# Patient Record
Sex: MALE | Race: WHITE
[De-identification: names, ages, dates, MRNs, and addresses within clinical notes are randomized per-mention and may not be internally consistent; named-entity substitution may affect disease eponyms.]

---

## 2021-08-26 ENCOUNTER — HOSPITAL ENCOUNTER (EMERGENCY)
Dept: HOSPITAL 47 - EC | Age: 56
Discharge: HOME | End: 2021-08-26
Payer: COMMERCIAL

## 2021-08-26 VITALS
TEMPERATURE: 98.5 F | SYSTOLIC BLOOD PRESSURE: 108 MMHG | RESPIRATION RATE: 22 BRPM | HEART RATE: 85 BPM | DIASTOLIC BLOOD PRESSURE: 67 MMHG

## 2021-08-26 DIAGNOSIS — G44.209: ICD-10-CM

## 2021-08-26 DIAGNOSIS — G44.009: Primary | ICD-10-CM

## 2021-08-26 DIAGNOSIS — Z86.718: ICD-10-CM

## 2021-08-26 DIAGNOSIS — F17.200: ICD-10-CM

## 2021-08-26 PROCEDURE — 99284 EMERGENCY DEPT VISIT MOD MDM: CPT

## 2021-08-26 PROCEDURE — 96372 THER/PROPH/DIAG INJ SC/IM: CPT

## 2021-08-26 PROCEDURE — 70450 CT HEAD/BRAIN W/O DYE: CPT

## 2021-08-26 NOTE — CT
EXAMINATION TYPE: CT brain wo con

 

DATE OF EXAM: 8/26/2021

 

COMPARISON: None

 

HISTORY: Headache x 3 weeks

 

CT DLP: 1170.4 mGycm

Automated exposure control for dose reduction was used.

 

Ventricles have normal size. There is no mass effect nor midline shift. There is no sign of intracran
ial hemorrhage. Calvarium is intact.

 

IMPRESSION:

Negative unenhanced head CT scan.

## 2021-08-26 NOTE — ED
Headache HPI





- General


Chief Complaint: Headache


Stated Complaint: headache


Time Seen by Provider: 08/26/21 05:30


Mode of arrival: ambulatory


Limitations: no limitations





- Related Data


                                    Allergies











Allergy/AdvReac Type Severity Reaction Status Date / Time


 


codeine Allergy  Nausea & Verified 08/26/21 05:35





   Vomiting  














Review of Systems


ROS Statement: 


Those systems with pertinent positive or pertinent negative responses have been 

documented in the HPI.





ROS Other: All systems not noted in ROS Statement are negative.





Past Medical History


Additional Past Medical History / Comment(s): headaches, DVT


History of Any Multi-Drug Resistant Organisms: None Reported


Additional Past Surgical History / Comment(s): vein removal right leg


Past Psychological History: No Psychological Hx Reported


Smoking Status: Current every day smoker


Past Alcohol Use History: None Reported


Past Drug Use History: None Reported





General Exam


Limitations: no limitations





Course


                                   Vital Signs











  08/26/21





  05:29


 


Temperature 98.5 F


 


Pulse Rate 85


 


Respiratory 22





Rate 


 


Blood Pressure 108/67


 


O2 Sat by Pulse 96





Oximetry 














Disposition


Clinical Impression: 


 Headache, Cluster headaches, Tension headache





Disposition: HOME SELF-CARE


Condition: Good


Instructions (If sedation given, give patient instructions):  Acute Headache 

(ED)


Is patient prescribed a controlled substance at d/c from ED?: No


Referrals: 


Karlos Cabrales [Primary Care Provider] - 1-2 days

## 2021-09-03 ENCOUNTER — HOSPITAL ENCOUNTER (OUTPATIENT)
Dept: HOSPITAL 47 - LABWHC1 | Age: 56
Discharge: HOME | End: 2021-09-03
Attending: FAMILY MEDICINE
Payer: COMMERCIAL

## 2021-09-03 DIAGNOSIS — Z12.11: ICD-10-CM

## 2021-09-03 DIAGNOSIS — Z72.0: ICD-10-CM

## 2021-09-03 DIAGNOSIS — F41.9: ICD-10-CM

## 2021-09-03 DIAGNOSIS — Z00.00: Primary | ICD-10-CM

## 2021-09-03 LAB
BASOPHILS # BLD AUTO: 0.03 X 10*3/UL (ref 0–0.1)
BASOPHILS NFR BLD AUTO: 0.3 %
EOSINOPHIL # BLD AUTO: 0.22 X 10*3/UL (ref 0.04–0.35)
EOSINOPHIL NFR BLD AUTO: 2 %
ERYTHROCYTE [DISTWIDTH] IN BLOOD BY AUTOMATED COUNT: 4.36 X 10*6/UL (ref 4.4–5.6)
ERYTHROCYTE [DISTWIDTH] IN BLOOD: 12.8 % (ref 11.5–14.5)
HCT VFR BLD AUTO: 42.2 % (ref 39.6–50)
HGB BLD-MCNC: 14.4 G/DL (ref 13–17)
LYMPHOCYTES # SPEC AUTO: 1.38 X 10*3/UL (ref 0.9–5)
LYMPHOCYTES NFR SPEC AUTO: 12.8 %
MCH RBC QN AUTO: 33 PG (ref 27–32)
MCHC RBC AUTO-ENTMCNC: 34.1 G/DL (ref 32–37)
MCV RBC AUTO: 96.8 FL (ref 80–97)
MONOCYTES # BLD AUTO: 0.67 X 10*3/UL (ref 0.2–1)
MONOCYTES NFR BLD AUTO: 6.2 %
NEUTROPHILS # BLD AUTO: 8.47 X 10*3/UL (ref 1.8–7.7)
NEUTROPHILS NFR BLD AUTO: 78.2 %
PLATELET # BLD AUTO: 257 X 10*3/UL (ref 140–440)
WBC # BLD AUTO: 10.82 X 10*3/UL (ref 4.5–10)

## 2021-09-03 PROCEDURE — 84443 ASSAY THYROID STIM HORMONE: CPT

## 2021-09-03 PROCEDURE — 85025 COMPLETE CBC W/AUTO DIFF WBC: CPT

## 2021-09-03 PROCEDURE — 80053 COMPREHEN METABOLIC PANEL: CPT

## 2021-09-03 PROCEDURE — 36415 COLL VENOUS BLD VENIPUNCTURE: CPT

## 2021-09-03 PROCEDURE — 80061 LIPID PANEL: CPT

## 2021-09-04 LAB
ALBUMIN SERPL-MCNC: 4.6 G/DL (ref 3.8–4.9)
ALBUMIN/GLOB SERPL: 2.56 G/DL (ref 1.6–3.17)
ALP SERPL-CCNC: 46 U/L (ref 41–126)
ALT SERPL-CCNC: 16 U/L (ref 10–49)
ANION GAP SERPL CALC-SCNC: 11.2 MMOL/L (ref 4–12)
AST SERPL-CCNC: 19 U/L (ref 14–35)
BUN SERPL-SCNC: 11 MG/DL (ref 9–27)
BUN/CREAT SERPL: 13.75 RATIO (ref 12–20)
CALCIUM SPEC-MCNC: 10.3 MG/DL (ref 8.7–10.3)
CHLORIDE SERPL-SCNC: 109 MMOL/L (ref 96–109)
CHOLEST SERPL-MCNC: 169 MG/DL (ref 0–200)
CO2 SERPL-SCNC: 22.8 MMOL/L (ref 21.6–31.8)
GLOBULIN SER CALC-MCNC: 1.8 G/DL (ref 1.6–3.3)
GLUCOSE SERPL-MCNC: 95 MG/DL (ref 70–110)
HDLC SERPL-MCNC: 46 MG/DL (ref 40–60)
LDLC SERPL CALC-MCNC: 104 MG/DL (ref 0–131)
POTASSIUM SERPL-SCNC: 4.3 MMOL/L (ref 3.5–5.5)
PROT SERPL-MCNC: 6.4 G/DL (ref 6.2–8.2)
SODIUM SERPL-SCNC: 143 MMOL/L (ref 135–145)
TRIGL SERPL-MCNC: 95 MG/DL (ref 0–149)
VLDLC SERPL CALC-MCNC: 19 MG/DL (ref 5–40)

## 2021-11-17 ENCOUNTER — HOSPITAL ENCOUNTER (OUTPATIENT)
Dept: HOSPITAL 47 - ORWHC2ENDO | Age: 56
Discharge: HOME | End: 2021-11-17
Attending: SURGERY
Payer: COMMERCIAL

## 2021-11-17 VITALS — DIASTOLIC BLOOD PRESSURE: 69 MMHG | HEART RATE: 56 BPM | SYSTOLIC BLOOD PRESSURE: 105 MMHG

## 2021-11-17 VITALS — TEMPERATURE: 98 F

## 2021-11-17 VITALS — BODY MASS INDEX: 21.8 KG/M2

## 2021-11-17 VITALS — RESPIRATION RATE: 16 BRPM

## 2021-11-17 DIAGNOSIS — K57.30: ICD-10-CM

## 2021-11-17 DIAGNOSIS — Z79.899: ICD-10-CM

## 2021-11-17 DIAGNOSIS — Z12.11: Primary | ICD-10-CM

## 2021-11-17 DIAGNOSIS — Z88.5: ICD-10-CM

## 2021-11-17 DIAGNOSIS — Z80.9: ICD-10-CM

## 2021-11-17 DIAGNOSIS — Z98.890: ICD-10-CM

## 2021-11-17 DIAGNOSIS — F17.210: ICD-10-CM

## 2021-11-17 DIAGNOSIS — K63.5: ICD-10-CM

## 2021-11-17 DIAGNOSIS — Z86.718: ICD-10-CM

## 2021-11-17 DIAGNOSIS — K21.9: ICD-10-CM

## 2021-11-17 DIAGNOSIS — D12.3: ICD-10-CM

## 2021-11-17 DIAGNOSIS — K64.0: ICD-10-CM

## 2021-11-17 PROCEDURE — 88305 TISSUE EXAM BY PATHOLOGIST: CPT

## 2021-11-17 PROCEDURE — 45380 COLONOSCOPY AND BIOPSY: CPT

## 2021-11-17 PROCEDURE — 45385 COLONOSCOPY W/LESION REMOVAL: CPT

## 2021-11-17 NOTE — P.PCN
Date of Procedure: 11/17/21


Description of Procedure: 























PREOPERATIVE DIAGNOSIS:


Colonoscopy screening





POSTOPERATIVE DIAGNOSIS:


Tubular adenoma hepatic flexure


Tubular adenoma transverse colon


Sigmoid diverticulosis





OPERATION:


Colonoscopy to the ileocecal valve and appendiceal orifice, cecum


Colonoscopy with hot snare polypectomy


Colonoscopy with cold forceps biopsy





SURGEON: Erika Parnell MD.





ANESTHESIA: MAC.





INDICATIONS:


The patient is an 56-year-old male who presents for his first colonoscopy.  

Benefits and risks were described and informed consent was obtained.





DESCRIPTION OF PROCEDURE:


The patient had undergone Sutab prep.  The patient had been brought into the 

operating room and laid in the left lateral decubitus position.  After adequate 

intravenous sedation, the rectum was examined with 2% lidocaine jelly. The 

prostate was unremarkable.  No external hemorrhoids were encountered. The rectal

tone was within normal limits. No lesions were palpated in the rectal vault. An 

Olympus colonoscope was advanced until the cecum, ileocecal valve and 

appendiceal orifice were clearly viewed.  The prep was excellent.  Moderate 

sigmoid diverticulosis was encountered.  Colonic polyps were found and removed. 

No evidence of focal colitis was found. Retroflexion of the scope demonstrated 

grade 1 internal hemorrhoids without active bleeding or inflammation. The colon 

was desufflated. The patient had tolerated the procedure well. 





Withdrawal time was over 6 minutes.





FINDINGS:


Aronchick preparation quality scale 1 (1-5)


Internal hemorrhoids, grade 1 


No external hemorrhoids


No arteriovenous malformations.


Sigmoid diverticulosis, moderate


Removal of 2 polyps:


- Snare polypectomy splenic flexure, 8 mm tubulovillous adenoma polyp.


- Snare polypectomy mid transverse colon, 5 mm flat villous adenoma polyp.


No focal colitis.





RECOMMENDATIONS:


Repeat colonoscopy 3 years, 2024





Plan - Discharge Summary


Discharge Rx Participant: No


New Discharge Prescriptions: 


Continue


   Famotidine [Pepcid] 20 mg PO DAILY


Discharge Medication List





Famotidine [Pepcid] 20 mg PO DAILY 11/16/21 [History]








Follow up Appointment(s)/Referral(s): 


Erika Parnell MD [STAFF PHYSICIAN] - 11/30/21


Patient Instructions/Handouts:  Diverticulosis Diet (GEN), Diverticulosis (DC), 

Colorectal Polyps (DC), *Surgery [facility] - (Anesthesia) Endoscopy Discharge 

Instructions


Activity/Diet/Wound Care/Special Instructions: 


Repeat colonoscopy 3 years, 2024


Discharge Disposition: HOME SELF-CARE

## 2021-11-17 NOTE — P.GSHP
History of Present Illness


H&P Date: 11/17/21














CHIEF COMPLAINT: Colon screen





HISTORY OF PRESENT ILLNESS: The patient is a 56-year-old male who


presents for colon screen.  Lower endoscopy was offered for further evaluation 

and management.





PAST MEDICAL HISTORY: 


Please see list.





PAST SURGICAL HISTORY: 


Please see list.





MEDICATIONS: 


Please see list.





ALLERGIES:  Please see list. 





SOCIAL HISTORY: No illicit drug use





FAMILY HISTORY: No reports of Crohn disease or ulcerative colitis. 





REVIEW OF ORGAN SYSTEMS: 


CONSTITUTIONAL: No reports of fevers or chills.  





PHYSICAL EXAM: 


VITAL SIGNS:  Stable


GENERAL: Well-developed pleasant in no acute distress. 


HEENT: No scleral icterus. Extraocular movements grossly


intact. Moist buccal mucosa. 


NECK: Supple without lymphadenopathy. 


CHEST: Unlabored respirations. Equal bilateral excursions. 


CARDIOVASCULAR: Regular rate and rhythm. Distal 2+ pulses. 


ABDOMEN: Soft, nontender, nondistended. 


MUSCULOSKELETAL: No clubbing, cyanosis, or edema. 





ASSESSMENT: 


1.  Colon screen.





PLAN: 


1. Recommend proceeding with a lower endoscopy





Past Medical History


Past Medical History: Deep Vein Thrombosis (DVT), GERD/Reflux


Additional Past Medical History / Comment(s): headaches, DVT-RT ANKLE, AND RT 

THIGH


History of Any Multi-Drug Resistant Organisms: None Reported


Additional Past Surgical History / Comment(s): vein removal right leg


Past Anesthesia/Blood Transfusion Reactions: No Reported Reaction


Smoking Status: Current every day smoker





- Past Family History


  ** Sister(s)


Family Medical History: Cancer





Medications and Allergies


                                Home Medications











 Medication  Instructions  Recorded  Confirmed  Type


 


Famotidine [Pepcid] 20 mg PO DAILY 11/16/21 11/16/21 History








                                    Allergies











Allergy/AdvReac Type Severity Reaction Status Date / Time


 


codeine Allergy  Nausea & Verified 11/16/21 09:33





   Vomiting

## 2023-09-13 ENCOUNTER — HOSPITAL ENCOUNTER (EMERGENCY)
Dept: HOSPITAL 47 - EC | Age: 58
Discharge: HOME | End: 2023-09-13
Payer: COMMERCIAL

## 2023-09-13 VITALS — RESPIRATION RATE: 18 BRPM

## 2023-09-13 DIAGNOSIS — Z79.899: ICD-10-CM

## 2023-09-13 DIAGNOSIS — K21.9: ICD-10-CM

## 2023-09-13 DIAGNOSIS — Z79.82: ICD-10-CM

## 2023-09-13 DIAGNOSIS — R07.9: ICD-10-CM

## 2023-09-13 DIAGNOSIS — J06.9: Primary | ICD-10-CM

## 2023-09-13 DIAGNOSIS — Z88.5: ICD-10-CM

## 2023-09-13 DIAGNOSIS — Z87.891: ICD-10-CM

## 2023-09-13 LAB
ALBUMIN SERPL-MCNC: 4.3 G/DL (ref 3.5–5)
ALP SERPL-CCNC: 62 U/L (ref 38–126)
ALT SERPL-CCNC: 30 U/L (ref 4–49)
ANION GAP SERPL CALC-SCNC: 8 MMOL/L
APTT BLD: 22.3 SEC (ref 22–30)
AST SERPL-CCNC: 32 U/L (ref 17–59)
BASOPHILS # BLD AUTO: 0 K/UL (ref 0–0.2)
BASOPHILS NFR BLD AUTO: 0 %
BUN SERPL-SCNC: 12 MG/DL (ref 9–20)
CALCIUM SPEC-MCNC: 9.4 MG/DL (ref 8.4–10.2)
CHLORIDE SERPL-SCNC: 103 MMOL/L (ref 98–107)
CO2 SERPL-SCNC: 26 MMOL/L (ref 22–30)
EOSINOPHIL # BLD AUTO: 0.3 K/UL (ref 0–0.7)
EOSINOPHIL NFR BLD AUTO: 4 %
ERYTHROCYTE [DISTWIDTH] IN BLOOD BY AUTOMATED COUNT: 4.35 M/UL (ref 4.3–5.9)
ERYTHROCYTE [DISTWIDTH] IN BLOOD: 13.7 % (ref 11.5–15.5)
GLUCOSE SERPL-MCNC: 107 MG/DL (ref 74–99)
HCT VFR BLD AUTO: 39.6 % (ref 39–53)
HGB BLD-MCNC: 13.6 GM/DL (ref 13–17.5)
INR PPP: 0.9 (ref ?–1.2)
LYMPHOCYTES # SPEC AUTO: 2.1 K/UL (ref 1–4.8)
LYMPHOCYTES NFR SPEC AUTO: 29 %
MCH RBC QN AUTO: 31.2 PG (ref 25–35)
MCHC RBC AUTO-ENTMCNC: 34.3 G/DL (ref 31–37)
MCV RBC AUTO: 91.1 FL (ref 80–100)
MONOCYTES # BLD AUTO: 0.5 K/UL (ref 0–1)
MONOCYTES NFR BLD AUTO: 7 %
NEUTROPHILS # BLD AUTO: 4.4 K/UL (ref 1.3–7.7)
NEUTROPHILS NFR BLD AUTO: 59 %
PLATELET # BLD AUTO: 228 K/UL (ref 150–450)
POTASSIUM SERPL-SCNC: 3.9 MMOL/L (ref 3.5–5.1)
PROT SERPL-MCNC: 7.2 G/DL (ref 6.3–8.2)
PT BLD: 9.9 SEC (ref 9–12)
SODIUM SERPL-SCNC: 137 MMOL/L (ref 137–145)
WBC # BLD AUTO: 7.5 K/UL (ref 3.8–10.6)

## 2023-09-13 PROCEDURE — 80053 COMPREHEN METABOLIC PANEL: CPT

## 2023-09-13 PROCEDURE — 85025 COMPLETE CBC W/AUTO DIFF WBC: CPT

## 2023-09-13 PROCEDURE — 99284 EMERGENCY DEPT VISIT MOD MDM: CPT

## 2023-09-13 PROCEDURE — 71275 CT ANGIOGRAPHY CHEST: CPT

## 2023-09-13 PROCEDURE — 85730 THROMBOPLASTIN TIME PARTIAL: CPT

## 2023-09-13 PROCEDURE — 85610 PROTHROMBIN TIME: CPT

## 2023-09-13 PROCEDURE — 36415 COLL VENOUS BLD VENIPUNCTURE: CPT

## 2023-09-13 PROCEDURE — 85379 FIBRIN DEGRADATION QUANT: CPT

## 2023-09-13 NOTE — ED
Recheck HPI





- General


Source: patient, RN notes reviewed


Mode of arrival: ambulatory


Limitations: no limitations





<Matthieu Triplett - Last Filed: 09/13/23 13:46>





- History of Present Illness


MD Complaint: other (Concern for abnormal outpatient computed tomography scan)


Returns Today for: Called Because of Abnormal Lab/Test, persistent/worsening 

pain related to initial visit


Symptoms Since Prior Visit: worsening pain


Context: planned re-check


Associated Symptoms: none





<Fremin Orellana - Last Filed: 09/20/23 18:06>





- General


Chief Complaint: Recheck/Abnormal Lab/Rx


Stated Complaint: Abn labs


Time Seen by Provider: 09/13/23 13:47





- History of Present Illness


Initial Comments: 


58-year-old male presents emergency Department with chief complaint of abnormal 

x-ray.  Patient states that the saw some sort of abnormality on his chest x-ray 

and told to come here because he had a recent superficial clot.  Patient states 

he does have a history of DVT.  He is only on aspirin.  Patient states she's had

some cough congestion and believes he just has upper respiratory infection.


 (Matthieu Triplett)


This is a 58-year-old male to the emergency room for evaluation.  States he's 

presenting for evaluation regards to possible recent illness with upper 

respiratory symptoms and diagnosis of lower extremity thrombosis right or DVT tr

ansitioning to PE.  There were concerned with some shortness of breath the 

patient experienced 3 diagnosis of lower extremity thrombosis.  Patient does 

have some history of blood clots not currently on blood thinners 

(Fermin Orellana)





- Related Data


                                Home Medications











 Medication  Instructions  Recorded  Confirmed


 


Aspirin EC [Ecotrin] 325 mg PO DAILY 09/13/23 09/13/23


 


Cetirizine HCl [Zyrtec] 10 mg PO DAILY 09/13/23 09/13/23


 


Multivitamins, Thera [Multivitamin 1 tab PO DAILY 09/13/23 09/13/23





(formulary)]   


 


Omeprazole Magnesium [PriLOSEC OTC] 20 mg PO DAILY 09/13/23 09/13/23


 


PARoxetine [Paxil] 20 mg PO DAILY 09/13/23 09/13/23


 


Vitamin B Complex 1 cap PO DAILY 09/13/23 09/13/23











                                    Allergies











Allergy/AdvReac Type Severity Reaction Status Date / Time


 


codeine Allergy  Nausea & Verified 09/13/23 18:27





   Vomiting  














Review of Systems


ROS Other: All systems not noted in ROS Statement are negative.





<Matthieu Triplett - Last Filed: 09/13/23 13:46>


ROS Other: All systems not noted in ROS Statement are negative.





<Fermin Orellana - Last Filed: 09/20/23 18:06>


ROS Statement: 


Those systems with pertinent positive or pertinent negative responses have been 

documented in the HPI.








Past Medical History


Past Medical History: Deep Vein Thrombosis (DVT), GERD/Reflux


Additional Past Medical History / Comment(s): headaches, DVT-RT ANKLE, AND RT 

THIGH


History of Any Multi-Drug Resistant Organisms: None Reported


Additional Past Surgical History / Comment(s): vein removal right leg


Past Anesthesia/Blood Transfusion Reactions: No Reported Reaction


Past Psychological History: No Psychological Hx Reported


Smoking Status: Former smoker


Past Alcohol Use History: None Reported


Past Drug Use History: None Reported





- Past Family History


  ** Sister(s)


Family Medical History: Cancer





<Matthieu Triplett - Last Filed: 09/13/23 13:46>





General Exam


Limitations: no limitations





<Matthieu Triplett - Last Filed: 09/13/23 13:46>


General appearance: alert, in no apparent distress


Head exam: Present: atraumatic, normocephalic, normal inspection


Eye exam: Present: normal appearance, PERRL, EOMI.  Absent: scleral icterus, 

conjunctival injection, periorbital swelling


ENT exam: Present: normal exam, mucous membranes moist


Neck exam: Present: normal inspection.  Absent: tenderness, meningismus, lympha

denopathy


Respiratory exam: Present: normal lung sounds bilaterally.  Absent: respiratory 

distress, wheezes, rales, rhonchi, stridor


Cardiovascular Exam: Present: regular rate, normal rhythm, normal heart sounds. 

 Absent: systolic murmur, diastolic murmur, rubs, gallop, clicks


GI/Abdominal exam: Present: soft, normal bowel sounds.  Absent: distended, 

tenderness, guarding, rebound, rigid


Extremities exam: Present: normal inspection, full ROM, normal capillary refill.

  Absent: tenderness, pedal edema, joint swelling, calf tenderness


Back exam: Present: normal inspection


Neurological exam: Present: alert, oriented X3, CN II-XII intact


Psychiatric exam: Present: normal affect, normal mood


Skin exam: Present: warm, dry, intact, normal color.  Absent: rash





<Fermin Orellana - Last Filed: 09/20/23 18:06>





- General Exam Comments


Initial Comments: 


Visual Physical Exam





Vital signs reviewed





General: Well-appearing, nontoxic, no acute distress.


Head: Normocephalic, atraumatic


Eyes: PERRLA, EOMI


ENT: Airway patent


Chest: Nonlabored breathing


Skin: No visual rash, normal skin tone


Neuro: Alert and oriented 3


Musculoskeletal: No gross abnormalities


 (Matthieu Triplett)





Course





<Fermin Orellana - Last Filed: 09/20/23 18:06>


                                   Vital Signs











  09/13/23 09/13/23





  13:41 18:32


 


Temperature 97.8 F 98.5 F


 


Pulse Rate 86 71


 


Respiratory 18 18





Rate  


 


Blood Pressure 156/78 121/84


 


O2 Sat by Pulse 96 98





Oximetry  














- Reevaluation(s)


Reevaluation #1: 





09/13/23 17:55


Medical record is reviewed (Fermin Orellana)


Reevaluation #2: 





09/13/23 17:55


Patient is in no acute distress here in the ER (Fermin Orellana)


Reevaluation #3: 





09/13/23 17:55


Patient informed results and questions answered (Fermin Orellana)


Reevaluation #4: 





09/13/23 17:56


Was pt. sent in by a medical professional or institution (, PA, NP, urgent 

care, hospital, or nursing home...) When possible be specific


@  -no


Did you speak to anyone other than the patient for history (EMS, parent, family,

 police, friend...)? What history was obtained from this source 


@  -no


Did you review nursing and triage notes (agree or disagree)?  Why? 


@  -agree


Are old charts reviewed (outside hosp., previous admission, EMS record, old EKG,

 old radiological studies, urgent care reports/EKG's, nursing home records)? 

Report findings 


@  -yes


Differential Diagnosis (chest pain, altered mental status, abdominal pain women,

 abdominal pain men, vaginal bleeding, weakness, fever, dyspnea, syncope, 

headache, dizziness, GI bleed, back pain, seizure, CVA, palpatations, mental 

health, musculoskeletal)? 


@  -prior


EKG interpreted by me (3pts min.).


@  -no


X-rays interpreted by me (1pt min.).


@  -no


CT interpreted by me (1pt min.).


@  -yes


U/S interpreted by me (1pt. min.).


@  -no


What testing was considered but not performed or refused? (CT, X-rays, U/S, 

labs)? Why?


@  -none


What meds were considered but not given or refused? Why?


@  -none


Did you discuss the management of the patient with other professionals 

(professionals i.e. , PA, NP, lab, RT, psych nurse, , , 

teacher, , )? Give summary


@  -no


Was smoking cessation discussed for >3mins.?


@  -no


Was critical care preformed (if so, how long)?


@  -no


Were there social determinants of health that impacted care today? How? 

(Homelessness, low income, unemployed, alcoholism, drug addiction, transportat

ion, low edu. Level, literacy, decrease access to med. care, longterm, rehab)?


@  -none


Was there de-escalation of care discussed even if they declined (Discuss DNR or 

withdrawal of care, Hospice)? DNR status


@  -no


What co-morbidities impacted this encounter? (DM, HTN, Smoking, COPD, CAD, 

Cancer, CVA, ARF, Chemo, Hep., AIDS, mental health diagnosis, sleep apnea, 

morbid obesity)?


@  -none


Was patient admitted / discharged? Hospital course, mention meds given and 

route, prescriptions, significant lab abnormalities, going to OR and other 

pertinent info.


@  - 58 male to the emergency department for evaluation of abnormal outpatient 

x-ray.  Patient does have recent diagnosis of superficial thrombosis in the 

right lower extremity concern for blood clot today.  Patient has no findings 

here in the ER CTA is negative of chest.  Patient can be discharged home


Discharge


Undiagnosed new problem with uncertain prognosis?


@  -no


Drug Therapy requiring intensive monitoring for toxicity (Heparin, Nitro, 

Insulin, Cardizem)?


@  -no


Were any procedures done?


@  -no


Diagnosis/symptom?


@  -Chest pain UR infection


Acute, or Chronic, or Acute on Chronic?


@  -Acute


Uncomplicated (without systemic symptoms) or Complicated (systemic symptoms)?


@  -Complicated


Side effects of treatment?


@  -no


Exacerbation, Progression, or Severe Exacerbation?


@  -exacerbation


Poses a threat to life or bodily function? How? (Chest pain, USA, MI, pneumonia,

 PE, COPD, DKA, ARF, appy, cholecystitis, CVA, Diverticulitis, Homicidal, 

Suicidal, threat to staff... and all critical care pts)


@  -yes is DVT transitions to PE


 (Fermin Orellana)





Medical Decision Making





<Matthieu Triplett - Last Filed: 09/13/23 13:46>





- Lab Data


Result diagrams: 


                                 09/13/23 14:19





                                 09/13/23 14:19





- Radiology Data


Radiology results: report reviewed (CT angio chest negative for acute disease), 

image reviewed





<Fermin Orellana - Last Filed: 09/20/23 18:06>





- Medical Decision Making





I performed a quick note portion of this Chart signed Matthieu Triplett PA-C 

(Matthieu Triplett)


58 male to the emergency department for evaluation of abnormal outpatient x-ray.

  Patient does have recent diagnosis of superficial thrombosis in the right 

lower extremity concern for blood clot today.  Patient has no findings here in 

the ER CTA is negative of chest.  Patient can be discharged home 

(Fermin Orellana)





- Lab Data


                                   Lab Results











  09/13/23 09/13/23 09/13/23 Range/Units





  14:19 14:19 14:19 


 


WBC  7.5    (3.8-10.6)  k/uL


 


RBC  4.35    (4.30-5.90)  m/uL


 


Hgb  13.6    (13.0-17.5)  gm/dL


 


Hct  39.6    (39.0-53.0)  %


 


MCV  91.1    (80.0-100.0)  fL


 


MCH  31.2    (25.0-35.0)  pg


 


MCHC  34.3    (31.0-37.0)  g/dL


 


RDW  13.7    (11.5-15.5)  %


 


Plt Count  228    (150-450)  k/uL


 


MPV  8.4    


 


Neutrophils %  59    %


 


Lymphocytes %  29    %


 


Monocytes %  7    %


 


Eosinophils %  4    %


 


Basophils %  0    %


 


Neutrophils #  4.4    (1.3-7.7)  k/uL


 


Lymphocytes #  2.1    (1.0-4.8)  k/uL


 


Monocytes #  0.5    (0-1.0)  k/uL


 


Eosinophils #  0.3    (0-0.7)  k/uL


 


Basophils #  0.0    (0-0.2)  k/uL


 


PT   9.9   (9.0-12.0)  sec


 


INR   0.9   (<1.2)  


 


APTT   22.3   (22.0-30.0)  sec


 


D-Dimer   0.88 H   (<0.60)  mg/L FEU


 


Sodium    137  (137-145)  mmol/L


 


Potassium    3.9  (3.5-5.1)  mmol/L


 


Chloride    103  ()  mmol/L


 


Carbon Dioxide    26  (22-30)  mmol/L


 


Anion Gap    8  mmol/L


 


BUN    12  (9-20)  mg/dL


 


Creatinine    0.82  (0.66-1.25)  mg/dL


 


Est GFR (CKD-EPI)AfAm    >90  (>60 ml/min/1.73 sqM)  


 


Est GFR (CKD-EPI)NonAf    >90  (>60 ml/min/1.73 sqM)  


 


Glucose    107 H  (74-99)  mg/dL


 


Calcium    9.4  (8.4-10.2)  mg/dL


 


Total Bilirubin    0.5  (0.2-1.3)  mg/dL


 


AST    32  (17-59)  U/L


 


ALT    30  (4-49)  U/L


 


Alkaline Phosphatase    62  ()  U/L


 


Total Protein    7.2  (6.3-8.2)  g/dL


 


Albumin    4.3  (3.5-5.0)  g/dL














Disposition





<Matthieu Triplett - Last Filed: 09/13/23 13:46>


Is patient prescribed a controlled substance at d/c from ED?: No


Time of Disposition: 18:00





<Fermin Orellana - Last Filed: 09/20/23 18:06>


Clinical Impression: 


 Chest pain, Upper respiratory infection





Disposition: HOME SELF-CARE


Condition: Good


Instructions (If sedation given, give patient instructions):  Upper Respiratory 

Infection (ED)


Referrals: 


Jin Ortega MD [Primary Care Provider] - 1-2 days

## 2023-09-13 NOTE — CT
CT CHEST FOR PULMONARY EMBOLISM.

 

EXAMINATION TYPE: CT angio chest

 

DATE OF EXAM: 9/13/2023

 

INDICATION: sob, elevated d dimer

 

CT DLP: 530.7 mGycm, Automated exposure control for dose reduction was used.

 

CONTRAST: Patient injected with 100ml mL of Isovue 370.

 

COMPARISON: None

 

TECHNIQUE: CT of the chest is performed on a spiral scan at 2 mm thick sections.  Study is performed 
with intravenous contrast timed for evaluation for pulmonary embolism.  This will limit additional po
rtions of the evaluation.  3-D MIP images reconstructed by the technologist are reviewed on the compu
ter in the coronal and sagittal planes. 

 

FINDINGS:

No persistent filling defects are evident to suggest an acute pulmonary embolism.

 

No mediastinal or hilar adenopathy enlarged by CT criteria is evident.  The ascending aorta diameter 
at the level of the main pulmonary artery is 3.8 cm.  The main pulmonary artery diameter at the bifur
cation is 2.7 cm.

 

Lung windows are clear.

 

Limited CT section through the upper abdomen are unremarkable.

 

IMPRESSION:

1. No acute pulmonary embolus.

## 2023-09-14 VITALS — TEMPERATURE: 98.5 F | HEART RATE: 71 BPM | SYSTOLIC BLOOD PRESSURE: 121 MMHG | DIASTOLIC BLOOD PRESSURE: 84 MMHG
